# Patient Record
Sex: MALE | Race: WHITE | NOT HISPANIC OR LATINO | URBAN - METROPOLITAN AREA
[De-identification: names, ages, dates, MRNs, and addresses within clinical notes are randomized per-mention and may not be internally consistent; named-entity substitution may affect disease eponyms.]

---

## 2024-05-03 DIAGNOSIS — E23.0 GROWTH HORMONE DEFICIENCY (HCC): ICD-10-CM

## 2024-05-29 ENCOUNTER — OUTPATIENT INFUSION SERVICES (OUTPATIENT)
Dept: ONCOLOGY | Facility: MEDICAL CENTER | Age: 24
End: 2024-05-29
Attending: INTERNAL MEDICINE
Payer: COMMERCIAL

## 2024-05-29 VITALS
DIASTOLIC BLOOD PRESSURE: 95 MMHG | HEART RATE: 75 BPM | RESPIRATION RATE: 18 BRPM | WEIGHT: 201.5 LBS | OXYGEN SATURATION: 98 % | HEIGHT: 73 IN | SYSTOLIC BLOOD PRESSURE: 140 MMHG | BODY MASS INDEX: 26.71 KG/M2 | TEMPERATURE: 97.4 F

## 2024-05-29 DIAGNOSIS — E23.0 GROWTH HORMONE DEFICIENCY (HCC): ICD-10-CM

## 2024-05-29 RX ORDER — TRIAMCINOLONE ACETONIDE 1 MG/G
CREAM TOPICAL
COMMUNITY
Start: 2024-04-02

## 2024-05-29 RX ADMIN — ARGININE HYDROCHLORIDE 30 G: 10 INJECTION, SOLUTION INTRAVENOUS at 07:34

## 2024-05-29 RX ADMIN — CLONIDINE HYDROCHLORIDE 0.3 MG: 0.1 TABLET ORAL at 10:23

## 2024-05-29 NOTE — PROGRESS NOTES
Cynthia presented to [facility] for growth hormone stim test. Educated on POC, including arginine and lab tests over the next 3 hours. Handout on arginine HCl given to patient. Provided an opportunity for questions. PIV started to left AC, brisk blood return observed and flushed easily. Baseline labs drawn as ordered. Arginine HCl given per orders. Growth hormone levels drawn at intervals per order. Clonidine given per MAR. GHST tolerated without complications. PIV flushed and removed, gauze and Coban dressing placed. No further appts needed at this time. Pt discharged to home in good condition.

## 2024-05-31 LAB
GHRH SERPL-MCNC: 0.23 NG/ML (ref 0.05–3)
GHRH SERPL-MCNC: 0.27 NG/ML (ref 0.05–3)
GHRH SERPL-MCNC: 0.81 NG/ML (ref 0.05–3)
GHRH SERPL-MCNC: 1.13 NG/ML (ref 0.05–3)
GHRH SERPL-MCNC: 4.38 NG/ML (ref 0.05–3)
GHRH SERPL-MCNC: <0.05 NG/ML (ref 0.05–3)